# Patient Record
Sex: FEMALE | Race: WHITE
[De-identification: names, ages, dates, MRNs, and addresses within clinical notes are randomized per-mention and may not be internally consistent; named-entity substitution may affect disease eponyms.]

---

## 2017-10-31 ENCOUNTER — HOSPITAL ENCOUNTER (OUTPATIENT)
Dept: HOSPITAL 80 - FIMAGING | Age: 47
End: 2017-10-31
Attending: OBSTETRICS & GYNECOLOGY
Payer: COMMERCIAL

## 2017-10-31 DIAGNOSIS — Z12.31: Primary | ICD-10-CM

## 2017-10-31 PROCEDURE — G0202 SCR MAMMO BI INCL CAD: HCPCS

## 2018-03-03 ENCOUNTER — HOSPITAL ENCOUNTER (INPATIENT)
Dept: HOSPITAL 80 - FED | Age: 48
LOS: 2 days | Discharge: HOME | DRG: 482 | End: 2018-03-05
Attending: SURGERY | Admitting: SURGERY
Payer: COMMERCIAL
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
## 2018-03-03 DIAGNOSIS — Z88.2: ICD-10-CM

## 2018-03-03 DIAGNOSIS — R31.21: ICD-10-CM
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
## 2018-03-03 DIAGNOSIS — E04.9: ICD-10-CM

## 2018-03-03 DIAGNOSIS — S72.352A: Primary | ICD-10-CM

## 2018-03-03 LAB — PLATELET # BLD: 199 10^3/UL (ref 150–400)

## 2018-03-03 PROCEDURE — BQ14ZZZ FLUOROSCOPY OF LEFT FEMUR: ICD-10-PCS | Performed by: RADIOLOGY

## 2018-03-03 PROCEDURE — 0QS906Z REPOSITION LEFT FEMORAL SHAFT WITH INTRAMEDULLARY INTERNAL FIXATION DEVICE, OPEN APPROACH: ICD-10-PCS | Performed by: ORTHOPAEDIC SURGERY

## 2018-03-03 PROCEDURE — C1713 ANCHOR/SCREW BN/BN,TIS/BN: HCPCS

## 2018-03-03 RX ADMIN — Medication PRN MCG: at 22:00

## 2018-03-03 RX ADMIN — Medication PRN MCG: at 21:52

## 2018-03-03 RX ADMIN — LEVOTHYROXINE SODIUM SCH MCG: 0.09 TABLET ORAL at 23:24

## 2018-03-03 RX ADMIN — HYDROCODONE BITARTRATE AND ACETAMINOPHEN PRN TAB: 5; 325 TABLET ORAL at 23:17

## 2018-03-03 NOTE — POSTANESTH
Post Anesthetic Evaluation


Cardiovascular Status: Normal, Stable


Respiratory Status: Normal, Stable, Similar to Pre-op Cond.


Level of Consciousness/Mental Status: Can Participate in Eval, Mildly Sleepy, 

Arousable


Pain Control: Adequate, Prn Tx Ordered


Nausea/Vomiting Control: Adequate, Prn Tx Ordered


Complications Possibly Related to Anesthesia: None Noted

## 2018-03-03 NOTE — PDANEPAE
ANE Past Medical History





- Pulmonary History


Hx Oxygen in Use at Home: No


Hx Sleep Apnea: No





- Endocrine History


Hx Diabetes: No


Hypothyroid: Yes





- GI History


GERD: no





ANE Review of Systems


Review of Systems: 








- Exercise capacity


METS (RN): 6 METS





ANE Patient History





- Allergies


Allergies/Adverse Reactions: 








Sulfa (Sulfonamide Antibiotics) Allergy (Severe, Verified 03/03/18 17:08)


 Hives








- Home Medications


Home Medications: 








Herbals/Supplements -Info Only 1 ea PO HS 03/03/18 [Last Taken 03/03/18]


Levothyroxine [Synthroid 88 mcg (*)] 88 mcg PO HS 03/03/18 [Last Taken 03/02/18]


Loratadine [Claritin 10 mg] 10 mg PO HS 03/03/18 [Last Taken 03/02/18]


Tretinoin [Retin-A] 1 bebe TP DAILY PRN 03/03/18 [Last Taken Unknown]


l-Norgest/E.estradiol-E.estrad [Amethia Lo Tablet] 1 each PO HS 03/03/18 [Last 

Taken 03/02/18]








- NPO status


NPO Since - Liquids (Date): 03/03/18


NPO Since - Liquids (Time): 12:00


NPO Since - Solids (Date): 03/03/18


NPO Since - Solids (Time): 09:00





- Anes Hx


Anes Hx: post operative nausea and vomiting





- Smoking Hx


Smoking Status: Never smoked





ANE Labs/Vital Signs





- Labs


Result Diagrams: 


 03/03/18 16:40





 03/03/18 16:40





- Vital Signs


Blood Pressure: 144/90


Heart Rate: 84


Respiratory Rate: 18


O2 Sat (%): 97


Height: 154.94 cm


Weight: 61.689 kg





ANE Physical Exam





- Airway


Neck exam: FROM


Mallampati Score: Class 2


Mouth exam: normal dental/mouth exam





- Pulmonary


Pulmonary: no respiratory distress, no rales or rhonchi, clear to auscultation





- Cardiovascular


Cardiovascular: regular rate and rhythym, no murmur, rub, or gallop





- ASA Status


ASA Status: II





ANE Anesthesia Plan


Anesthesia Plan: GA w LMA
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
## 2018-03-03 NOTE — ASMTLACE
LACE

 

Acuity / Level of             Answers:  Yes                                   

Care: Did the patient                                                         

have an inpatient                                                             

admission?                                                                    

# of Emergency department     Answers:  1-2                                   

visits in the last 6                                                          

months                                                                        

Score: 4

 

Date Signed:  03/03/2018 04:57 PM

Electronically Signed By:Kat oDuglas RN
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
## 2018-03-04 RX ADMIN — HYDROCODONE BITARTRATE AND ACETAMINOPHEN PRN TAB: 5; 325 TABLET ORAL at 16:01

## 2018-03-04 RX ADMIN — HYDROCODONE BITARTRATE AND ACETAMINOPHEN PRN TAB: 5; 325 TABLET ORAL at 10:04

## 2018-03-04 RX ADMIN — HYDROCODONE BITARTRATE AND ACETAMINOPHEN PRN TAB: 5; 325 TABLET ORAL at 04:00

## 2018-03-04 RX ADMIN — DOCUSATE SODIUM AND SENNOSIDES SCH TAB: 50; 8.6 TABLET ORAL at 20:24

## 2018-03-04 RX ADMIN — HYDROCODONE BITARTRATE AND ACETAMINOPHEN PRN TAB: 5; 325 TABLET ORAL at 20:26

## 2018-03-04 RX ADMIN — ENOXAPARIN SODIUM SCH MG: 100 INJECTION SUBCUTANEOUS at 08:02

## 2018-03-04 RX ADMIN — CETIRIZINE HYDROCHLORIDE SCH: 10 TABLET, FILM COATED ORAL at 20:28

## 2018-03-04 RX ADMIN — CETIRIZINE HYDROCHLORIDE SCH: 10 TABLET, FILM COATED ORAL at 02:42

## 2018-03-04 RX ADMIN — LEVOTHYROXINE SODIUM SCH MCG: 0.09 TABLET ORAL at 20:24
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
## 2018-03-04 NOTE — ASMTCMCOM
CM Note

 

CM Note                       

Notes:

Pt admitted with L femur fx s/p ORIF. PT recommending Otpt rehab. OT recommending home no 

needs. Anticipate dc home independently. CM available if needs/changes.

 

Date Signed:  03/04/2018 06:44 PM

Electronically Signed By:Quynh Bach RN

## 2018-03-04 NOTE — SOAPPROG
SOAP Progress Note


Assessment/Plan: 


Assessment:


























Plan:





03/04/18 09:16


POD 1


pain controlled on PO


mobility is a concern


PT/OT to visit


discharge when mobile.  TDWB L leg.  walker or crutch


03/04/18 09:20





Subjective: 





POD1


eating breakfast


daughter at bedside


Objective: 





 Vital Signs











Temp Pulse Resp BP Pulse Ox


 


 36.8 C   82   16   125/81 H  99 


 


 03/04/18 07:30  03/04/18 07:30  03/04/18 07:30  03/04/18 07:30  03/04/18 07:30








 Laboratory Results





 03/03/18 22:30 





 











 03/03/18 03/04/18 03/05/18





 05:59 05:59 05:59


 


Intake Total  2400 


 


Output Total  800 


 


Balance  1600 








L thigh swollen


active TA/GS


active heel slide to 10 degrees


L thigh soft


dressing with slight serosang





Xrays L hip today with nice alignment


Hardware in place


coronal split in proximal anterior femoral shaft looks stable


medial calcar and femoral neck look good











ICD10 Worksheet


Patient Problems: 


 Problems











Problem Status Onset


 


Femur fracture, left Acute
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
## 2018-03-05 VITALS
DIASTOLIC BLOOD PRESSURE: 84 MMHG | SYSTOLIC BLOOD PRESSURE: 104 MMHG | OXYGEN SATURATION: 94 % | TEMPERATURE: 98.6 F | HEART RATE: 106 BPM

## 2018-03-05 VITALS — RESPIRATION RATE: 16 BRPM

## 2018-03-05 RX ADMIN — HYDROCODONE BITARTRATE AND ACETAMINOPHEN PRN TAB: 5; 325 TABLET ORAL at 12:18

## 2018-03-05 RX ADMIN — HYDROCODONE BITARTRATE AND ACETAMINOPHEN PRN TAB: 5; 325 TABLET ORAL at 08:34

## 2018-03-05 RX ADMIN — HYDROCODONE BITARTRATE AND ACETAMINOPHEN PRN TAB: 5; 325 TABLET ORAL at 16:13

## 2018-03-05 RX ADMIN — ENOXAPARIN SODIUM SCH MG: 100 INJECTION SUBCUTANEOUS at 08:39

## 2018-03-05 RX ADMIN — HYDROCODONE BITARTRATE AND ACETAMINOPHEN PRN TAB: 5; 325 TABLET ORAL at 04:57

## 2018-03-05 RX ADMIN — DOCUSATE SODIUM AND SENNOSIDES SCH TAB: 50; 8.6 TABLET ORAL at 08:33

## 2018-03-05 NOTE — SOAPPROG
SOAP Progress Note


Assessment/Plan: 


Assessment:


























Plan:





03/04/18 09:16


POD 1


pain controlled on PO


mobility is a concern


PT/OT to visit


discharge when mobile.  TDWB L leg.  walker or crutch


03/04/18 09:20





03/05/18 09:50


POD 2


in good spirits


home today


ASA qd for 10 days


heel slides


ankle ROM


TDWB LLE


fu in one week for xrays


Subjective: 





POD 2


daughter at bedside


norco helpful


Objective: 





 Vital Signs











Temp Pulse Resp BP Pulse Ox


 


 37.1 C   94   15   95/62 L  96 


 


 03/05/18 07:37  03/05/18 07:37  03/05/18 07:37  03/05/18 07:37  03/05/18 07:37








 Laboratory Results





 03/03/18 22:30 





 











 03/04/18 03/05/18 03/06/18





 05:59 05:59 05:59


 


Intake Total 2400 1500 


 


Output Total 800 300 


 


Balance 1600 1200 








thigh soft and swollen


active motion ankle


dressing with slight serosang


bruising around glute incision


no redness


abdomen soft





ICD10 Worksheet


Patient Problems: 


 Problems











Problem Status Onset


 


Femur fracture, left Acute

## 2018-04-03 ENCOUNTER — HOSPITAL ENCOUNTER (OUTPATIENT)
Dept: HOSPITAL 80 - FIMAGING | Age: 48
End: 2018-04-03
Attending: ORTHOPAEDIC SURGERY
Payer: COMMERCIAL

## 2018-04-03 ENCOUNTER — HOSPITAL ENCOUNTER (EMERGENCY)
Dept: HOSPITAL 80 - FED | Age: 48
Discharge: HOME | End: 2018-04-03
Payer: COMMERCIAL

## 2018-04-03 VITALS — DIASTOLIC BLOOD PRESSURE: 102 MMHG | HEART RATE: 82 BPM | RESPIRATION RATE: 18 BRPM | SYSTOLIC BLOOD PRESSURE: 162 MMHG

## 2018-04-03 VITALS — TEMPERATURE: 98.4 F | OXYGEN SATURATION: 98 %

## 2018-04-03 DIAGNOSIS — I82.402: Primary | ICD-10-CM

## 2018-04-03 DIAGNOSIS — I82.812: Primary | ICD-10-CM

## 2018-04-03 LAB
INR PPP: 1.04 (ref 0.83–1.16)
PLATELET # BLD: 249 10^3/UL (ref 150–400)
PROTHROMBIN TIME: 13.8 SEC (ref 12–15)

## 2018-04-03 NOTE — EDPHY
H & P


Time Seen by Provider: 04/03/18 18:50


HPI/ROS: 





CHIEF COMPLAINT:  DVT





HISTORY OF PRESENT ILLNESS:  Left femur ORIF on 3/3/2018 had ultrasound today 

showing DVT in the leg.  She has some leg cramping over the last couple of days 

in her surgeon ordered an ultrasound which showed DVT.  Leg cramping is not 

associated with weakness or numbness in the foot or fever or chills.  No chest 

pain or shortness of breath.  Symptoms mild.





REVIEW OF SYSTEMS:


Eye: no change in vision


ENT: no sore throat


Cardiac: no chest pain or syncope


Pulmonary: no cough or SOB


Abdomen:  No abdominal pain


Musculoskeletal:  HPI


Skin:  Residual bruising on the left lateral femur area.


Neuro:  No weakness or numbness in the left foot


Constitutional: no fever


: no urinary symptoms





A comprehensive 10 point review of systems is otherwise negative aside from 

elements mentioned in the history of present illness.





PAST MEDICAL HISTORY:  Thyroid , femur fracture





Social history:  Here with her daughter





General Appearance: Alert and conversant, cooperative.


Eyes: No scleral  icterus. 


ENT, Mouth: Normal mucous membranes.


Respiratory: Normal respiratory effort, breath sounds equal, lungs are clear to 

auscultation.


Cardiovascular:  Regular rate and rhythm.


Gastrointestinal:  Abdomen is soft and non tender.


Neurological: Alert, face symmetric, normal motor and sensory in extremities. 


Skin:  Some left leg and thigh bruising, not new.


Musculoskeletal:  Compartments are soft in both calves and thigh.


Psychiatric: Not agitated.





Emergency Department course/MDM:





The risk benefit alternatives of anticoagulation with Eliquis discussed and 

consented.


Screening labs, 1 week of medication, primary care referral.


Does not have signs or symptoms of compartment syndrome or postoperative 

infection, or pulmonary embolism.


1924:  Creatinine 0.7, 1 week of Eliquis 10 mg p.o. Twice daily and outpatient 

follow-up.


Smoking Status: Never smoked


Constitutional: 


 Initial Vital Signs











Temperature (C)  36.9 C   04/03/18 18:35


 


Heart Rate  75   04/03/18 18:35


 


Respiratory Rate  16   04/03/18 18:35


 


Blood Pressure  157/97 H  04/03/18 18:35


 


O2 Sat (%)  98   04/03/18 18:35








 











O2 Delivery Mode               Room Air














Allergies/Adverse Reactions: 


 





Sulfa (Sulfonamide Antibiotics) Allergy (Severe, Verified 04/03/18 18:33)


 Hives








Home Medications: 














 Medication  Instructions  Recorded


 


Levothyroxine [Synthroid 88 mcg 88 mcg PO HS 03/03/18





(*)]  


 


Loratadine [Claritin 10 mg] 10 mg PO HS 03/03/18


 


Tretinoin [RETIN-A] 1 bebe TP DAILY PRN 03/03/18


 


l-Norgest/E.estradiol-E.estrad 1 each PO HS 03/03/18





[Amethia Lo Tablet]  


 


Apixaban [Eliquis] 10 mg PO BID 7 Days  tab 04/03/18














Medical Decision Making





- Data Points


Laboratory Results: 


 Laboratory Results





 04/03/18 19:20 





 04/03/18 19:20 





 











  04/03/18 04/03/18 04/03/18





  19:24 19:20 19:20


 


WBC      





    


 


RBC      





    


 


Hgb      





    


 


POC Hgb  13.9 gm/dL gm/dL    





   (12.6-16.3)   


 


Hct      





    


 


POC Hct  41 % %    





   (38-47)   


 


MCV      





    


 


MCH      





    


 


MCHC      





    


 


RDW      





    


 


Plt Count      





    


 


MPV      





    


 


Neut % (Auto)      





    


 


Lymph % (Auto)      





    


 


Mono % (Auto)      





    


 


Eos % (Auto)      





    


 


Baso % (Auto)      





    


 


Nucleat RBC Rel Count      





    


 


Absolute Neuts (auto)      





    


 


Absolute Lymphs (auto)      





    


 


Absolute Monos (auto)      





    


 


Absolute Eos (auto)      





    


 


Absolute Basos (auto)      





    


 


Absolute Nucleated RBC      





    


 


Immature Gran %      





    


 


Immature Gran #      





    


 


PT      13.8 SEC SEC





     (12.0-15.0) 


 


INR      1.04 





     (0.83-1.16) 


 


APTT      27.4 SEC SEC





     (23.0-38.0) 


 


POC Sodium  140 mEq/L mEq/L    





   (135-145)   


 


Sodium    136 mEq/L mEq/L  





    (135-145)  


 


POC Potassium  3.9 mEq/L mEq/L    





   (3.3-5.0)   


 


Potassium    4.3 mEq/L mEq/L  





    (3.5-5.2)  


 


POC Chloride  105 mEq/L mEq/L    





   ()   


 


Chloride    103 mEq/L mEq/L  





    ()  


 


Carbon Dioxide    21 mEq/l L mEq/l  





    (22-31)  


 


Anion Gap    12 mEq/L mEq/L  





    (8-16)  


 


POC BUN  14 mg/dL mg/dL    





   (7-23)   


 


BUN    15 mg/dL mg/dL  





    (7-23)  


 


Creatinine    0.7 mg/dL mg/dL  





    (0.6-1.0)  


 


POC Creatinine  0.7 mg/dL mg/dL    





   (0.6-1.0)   


 


Estimated GFR    > 60   





    


 


Glucose    78 mg/dL mg/dL  





    ()  


 


POC Glucose  84 mg/dL mg/dL    





   ()   


 


Calcium    8.9 mg/dL mg/dL  





    (8.5-10.4)  














  04/03/18





  19:20


 


WBC  6.47 10^3/uL 10^3/uL





   (3.80-9.50) 


 


RBC  4.16 10^6/uL L 10^6/uL





   (4.18-5.33) 


 


Hgb  13.0 g/dL g/dL





   (12.6-16.3) 


 


POC Hgb  





  


 


Hct  39.8 % %





   (38.0-47.0) 


 


POC Hct  





  


 


MCV  95.7 fL fL





   (81.5-99.8) 


 


MCH  31.3 pg pg





   (27.9-34.1) 


 


MCHC  32.7 g/dL g/dL





   (32.4-36.7) 


 


RDW  13.8 % %





   (11.5-15.2) 


 


Plt Count  249 10^3/uL 10^3/uL





   (150-400) 


 


MPV  9.7 fL fL





   (8.7-11.7) 


 


Neut % (Auto)  58.0 % %





   (39.3-74.2) 


 


Lymph % (Auto)  31.1 % %





   (15.0-45.0) 


 


Mono % (Auto)  6.8 % %





   (4.5-13.0) 


 


Eos % (Auto)  2.9 % %





   (0.6-7.6) 


 


Baso % (Auto)  0.9 % %





   (0.3-1.7) 


 


Nucleat RBC Rel Count  0.0 % %





   (0.0-0.2) 


 


Absolute Neuts (auto)  3.75 10^3/uL 10^3/uL





   (1.70-6.50) 


 


Absolute Lymphs (auto)  2.01 10^3/uL 10^3/uL





   (1.00-3.00) 


 


Absolute Monos (auto)  0.44 10^3/uL 10^3/uL





   (0.30-0.80) 


 


Absolute Eos (auto)  0.19 10^3/uL 10^3/uL





   (0.03-0.40) 


 


Absolute Basos (auto)  0.06 10^3/uL 10^3/uL





   (0.02-0.10) 


 


Absolute Nucleated RBC  0.00 10^3/uL 10^3/uL





   (0-0.01) 


 


Immature Gran %  0.3 % %





   (0.0-1.1) 


 


Immature Gran #  0.02 10^3/uL 10^3/uL





   (0.00-0.10) 


 


PT  





  


 


INR  





  


 


APTT  





  


 


POC Sodium  





  


 


Sodium  





  


 


POC Potassium  





  


 


Potassium  





  


 


POC Chloride  





  


 


Chloride  





  


 


Carbon Dioxide  





  


 


Anion Gap  





  


 


POC BUN  





  


 


BUN  





  


 


Creatinine  





  


 


POC Creatinine  





  


 


Estimated GFR  





  


 


Glucose  





  


 


POC Glucose  





  


 


Calcium  





  











Medications Given: 


 








Discontinued Medications





Apixaban (Eliquis)  10 mg PO EDNOW ONE


   Stop: 04/03/18 19:10


   Last Admin: 04/03/18 19:50 Dose:  10 mg





Point of Care Test Results: 


 











  04/03/18





  19:24


 


POC Sodium  140


 


POC Potassium  3.9


 


POC Chloride  105


 


POC BUN  14


 


POC Creatinine  0.7


 


POC Glucose  84














Departure





- Departure


Disposition: Home, Routine, Self-Care


Clinical Impression: 


Left leg DVT


Qualifiers:


 Affected thrombotic vein of extremity: unspecified vein of extremity Chronicity

: acute Qualified Code(s): I82.402 - Acute embolism and thrombosis of 

unspecified deep veins of left lower extremity





Condition: Good


Instructions:  Apixaban (By mouth), Deep Vein Thrombosis (ED)


Additional Instructions: 


Please follow-up with Dr. Wharton or another provider at the [facility] this week.  Tell them you are referred for follow-up for a DVT.


Referrals: 


Spenser Wharton MD [[facility]] - 2-3 days without fail


Prescriptions: 


Apixaban [Eliquis] 10 mg PO BID 7 Days  tab